# Patient Record
Sex: MALE
[De-identification: names, ages, dates, MRNs, and addresses within clinical notes are randomized per-mention and may not be internally consistent; named-entity substitution may affect disease eponyms.]

---

## 2021-02-01 ENCOUNTER — APPOINTMENT (OUTPATIENT)
Dept: INTERNAL MEDICINE | Facility: CLINIC | Age: 56
End: 2021-02-01
Payer: MEDICAID

## 2021-02-04 ENCOUNTER — APPOINTMENT (OUTPATIENT)
Dept: INTERNAL MEDICINE | Facility: CLINIC | Age: 56
End: 2021-02-04
Payer: MEDICAID

## 2021-02-04 VITALS
BODY MASS INDEX: 23.86 KG/M2 | SYSTOLIC BLOOD PRESSURE: 125 MMHG | DIASTOLIC BLOOD PRESSURE: 73 MMHG | WEIGHT: 152 LBS | HEIGHT: 67 IN | TEMPERATURE: 97.4 F

## 2021-02-04 DIAGNOSIS — F17.200 NICOTINE DEPENDENCE, UNSPECIFIED, UNCOMPLICATED: ICD-10-CM

## 2021-02-04 DIAGNOSIS — Z80.1 FAMILY HISTORY OF MALIGNANT NEOPLASM OF TRACHEA, BRONCHUS AND LUNG: ICD-10-CM

## 2021-02-04 DIAGNOSIS — J93.0 SPONTANEOUS TENSION PNEUMOTHORAX: ICD-10-CM

## 2021-02-04 PROCEDURE — 99205 OFFICE O/P NEW HI 60 MIN: CPT

## 2021-02-04 PROCEDURE — 99072 ADDL SUPL MATRL&STAF TM PHE: CPT

## 2021-02-04 NOTE — HISTORY OF PRESENT ILLNESS
[Sexually Active] : The patient is sexually active [Women] : with women [Female ___] : [unfilled] female [FreeTextEntry1] : Patient is a 55-year-old white male who is transitioning to a new infectious disease doctor. Patient was diagnosed with HIV in 2001. He states he was in snf at the time and had bilateral collapsed lungs requiring surgery. At that time HIV was diagnosed. He states his viral load was low but it is unaware of any infections at that time specifically pneumocystis.\par Patient was placed on Atripla and eventually transitioned to genvoya. He states he never had hepatitis B or C. or any STDs. 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

## 2021-02-04 NOTE — ASSESSMENT
[FreeTextEntry1] : Patient seen for  evaluation of HIV disease. Prior labs have been reviewed and discussed with the patient.  Compliant with all medications. Viral load remains undetectable per patient but\par Severe pain and T-cell count remains 700 per patient but no LAD severe pain The patient is up to date in vaccines in all issues surrounding their illness had been discussed and all questions answered.  Goals of therapy and issues of transmissibility as well as STD prevention were discussed in depth. Patient was conversant and all relevant questions were asked and answered .The patient has been instructed to followup here 4 weeks\par \par Patient comes here with no labs. We will attempt to get all labs and start of her hospital. Patient was sent for full laboratory data including HIV screening test to confirm these HIV positive in the absence of any labs. We will also check for hepatitis ABC as well as syphilis and tuberculosis\par \par At next visit we will discuss changing medication to BIKTARVY\par \par Patient states he is not currently in any long-term relationships and condom use was discussed with all sexual encounters\par \par All issues regarding patient's health and medical problems have been discussed. The patient understands and concurs with the treatment plan.\par \par This was an extended visit lasting 60minutes, including review of prior notes laboratory data and examination and discussing with patient including completion of current note.\par \par  [Treatment Education] : treatment education [Treatment Adherence] : treatment adherence [Rx Dose / Side Effects] : Rx dose/side effects [Risk Reduction] : risk reduction [Drug Interactions / Side Effects] : drug interactions/side effects [Disclosure of Diagnosis] : disclosure of diagnosis [HIV Education] : HIV Education [Sexuality / Safer Sex] : sexuality/safer sex

## 2021-03-08 ENCOUNTER — APPOINTMENT (OUTPATIENT)
Dept: INTERNAL MEDICINE | Facility: CLINIC | Age: 56
End: 2021-03-08
Payer: MEDICAID

## 2021-03-08 VITALS
TEMPERATURE: 96.8 F | SYSTOLIC BLOOD PRESSURE: 100 MMHG | DIASTOLIC BLOOD PRESSURE: 70 MMHG | BODY MASS INDEX: 24.33 KG/M2 | HEIGHT: 67 IN | WEIGHT: 155 LBS

## 2021-03-08 PROCEDURE — 99215 OFFICE O/P EST HI 40 MIN: CPT

## 2021-03-08 PROCEDURE — 99072 ADDL SUPL MATRL&STAF TM PHE: CPT

## 2021-03-08 RX ORDER — ELVITEGRAVIR, COBICISTAT, EMTRICITABINE, AND TENOFOVIR ALAFENAMIDE 150; 150; 200; 10 MG/1; MG/1; MG/1; MG/1
150-150-200-10 TABLET ORAL
Qty: 30 | Refills: 0 | Status: COMPLETED | COMMUNITY
Start: 2021-01-27 | End: 2021-03-08

## 2021-03-08 NOTE — DATA REVIEWED
[FreeTextEntry1] : labs reviewed\par BEP B SAB POS\par HEP A AB POS\par CD4 900\par VL<20\par HEP C NEH\par SYPH NEG

## 2021-03-08 NOTE — HISTORY OF PRESENT ILLNESS
[Sexually Active] : The patient is sexually active [FreeTextEntry1] : Patient is a 55-year-old white male who is transitioning to a new infectious disease doctor. Patient was diagnosed with HIV in 2001. He states he was in snf at the time and had bilateral collapsed lungs requiring surgery. At that time HIV was diagnosed. He states his viral load was low but it is unaware of any infections at that time specifically pneumocystis.\par Patient was placed on Atripla and eventually transitioned to genvoya. He states he never had hepatitis B or C. or any STDs. He states his T cells are approximately 700 and viral load is undetectable since initial labs after diagnosis.\par He is asymptomatic at the current time.\par Patient identifies as CIS gender heterosexual and is currently not in any long-term relationship.\par \par 03/08/91\par Patient presents for routine evaluation of HIV disease. Has been compliant with  medication and has recently had blood. Denies any OI symptoms and is otherwise feeling well. There are no new complaints.\par recent labs

## 2021-03-08 NOTE — HISTORY OF PRESENT ILLNESS
[Sexually Active] : The patient is sexually active [FreeTextEntry1] : Patient is a 55-year-old white male who is transitioning to a new infectious disease doctor. Patient was diagnosed with HIV in 2001. He states he [facility] the time and had bilateral collapsed lungs requiring surgery. At that time HIV was diagnosed. He states his viral load was low but it is unaware of any infections at that time specifically pneumocystis.\par Patient was placed on Atripla and eventually transitioned to genvoya. He states he never had hepatitis B or C. or any STDs. He states his T cells are approximately 700 and viral load is undetectable since initial labs after diagnosis.\par He is asymptomatic at the current time.\par Patient identifies as CIS gender heterosexual and is currently not in any long-term relationship.\par \par 03/08/91\par Patient presents for routine evaluation of HIV disease. Has been compliant with  medication and has recently had blood. Denies any OI symptoms and is otherwise feeling well. There are no new complaints.\par recent labs

## 2021-03-08 NOTE — ASSESSMENT
[FreeTextEntry1] : Patient seen for  evaluation of HIV disease. Prior labs have been reviewed and discussed with the patient.  Compliant with all medications. Viral load remains undetectable and T-cell count remains over 900] The patient is up to date in vaccines in all issues surrounding their illness had been discussed and all questions answered.  Goals of therapy and issues of transmissibility as well as STD prevention were discussed in depth. Patient was conversant and all relevant questions were asked and answered .The patient has been instructed to followup here 4 months\par Patient had full laboratory data and is immunized against hepatitis AB and hep C negative as mentioned.  We had shared decision-making regarding choice of medication.  There is my recommendation that he start on Biktarvy which was sent to his drugstore and stop Genvoya.  Patient states he will make his decision based on the cost of the drug.  We did discuss drug coupons and he will decide and let the office know.  Patient will follow up in 4 months with new blood work\par All issues regarding patient's health and medical problems have been discussed. The patient understands and concurs with the treatment plan.\par This was an extended visit lasting 45 minutes, including review of prior notes laboratory data and examination and discussing with patient including completion of current note.\par \par

## 2021-11-16 ENCOUNTER — RX RENEWAL (OUTPATIENT)
Age: 56
End: 2021-11-16

## 2022-05-04 ENCOUNTER — NON-APPOINTMENT (OUTPATIENT)
Age: 57
End: 2022-05-04

## 2022-05-09 NOTE — HISTORY OF PRESENT ILLNESS
[FreeTextEntry1] : Patient is a 55-year-old white male who is transitioning to a new infectious disease doctor. Patient was diagnosed with HIV in 2001. 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 At that time HIV was diagnosed. He states his viral load was low but it is unaware of any infections at that time specifically pneumocystis.\par Patient was placed on Atripla and eventually transitioned to genvoya. He states he never had hepatitis B or C. or any STDs. He states his T cells are approximately 700 and viral load is undetectable since initial labs after diagnosis.\par He is asymptomatic at the current time.\par Patient identifies as CIS gender heterosexual and is currently not in any long-term relationship.\par \par 03/08/91\par Patient presents for routine evaluation of HIV disease. Has been compliant with  medication and has recently had blood. Denies any OI symptoms and is otherwise feeling well. There are no new complaints.\par recent labs\par \par [id number]

## 2022-05-10 ENCOUNTER — APPOINTMENT (OUTPATIENT)
Dept: INTERNAL MEDICINE | Facility: CLINIC | Age: 57
End: 2022-05-10

## 2022-05-10 ENCOUNTER — NON-APPOINTMENT (OUTPATIENT)
Age: 57
End: 2022-05-10

## 2022-11-14 ENCOUNTER — APPOINTMENT (OUTPATIENT)
Dept: INTERNAL MEDICINE | Facility: CLINIC | Age: 57
End: 2022-11-14

## 2022-11-14 VITALS
DIASTOLIC BLOOD PRESSURE: 70 MMHG | WEIGHT: 155 LBS | HEIGHT: 67 IN | BODY MASS INDEX: 24.33 KG/M2 | SYSTOLIC BLOOD PRESSURE: 100 MMHG

## 2022-11-14 PROCEDURE — 99214 OFFICE O/P EST MOD 30 MIN: CPT

## 2022-11-14 NOTE — PHYSICAL EXAM
[General Appearance - Alert] : alert [General Appearance - In No Acute Distress] : in no acute distress [Sclera] : the sclera and conjunctiva were normal [PERRL With Normal Accommodation] : pupils were equal in size, round, reactive to light [Extraocular Movements] : extraocular movements were intact [Outer Ear] : the ears and nose were normal in appearance [Oropharynx] : the oropharynx was normal with no thrush [Neck Appearance] : the appearance of the neck was normal [Neck Cervical Mass (___cm)] : no neck mass was observed [Jugular Venous Distention Increased] : there was no jugular-venous distention [Thyroid Diffuse Enlargement] : the thyroid was not enlarged [Auscultation Breath Sounds / Voice Sounds] : lungs were clear to auscultation bilaterally [Heart Rate And Rhythm] : heart rate was normal and rhythm regular [Heart Sounds] : normal S1 and S2 [Heart Sounds Gallop] : no gallops [Murmurs] : no murmurs [Heart Sounds Pericardial Friction Rub] : no pericardial rub [Full Pulse] : the pedal pulses are present [Edema] : there was no peripheral edema [Bowel Sounds] : normal bowel sounds [Abdomen Soft] : soft [Abdomen Tenderness] : non-tender [Costovertebral Angle Tenderness] : no CVA tenderness [Abdomen Mass (___ Cm)] : no abdominal mass palpated [Penis Abnormality] : the penis was normal [Scrotum] : the scrotum was normal [Testes Swelling] : the testicles were not swollen [Testes Mass (___cm)] : there were no testicular masses [No Palpable Adenopathy] : no palpable adenopathy [Musculoskeletal - Swelling] : no joint swelling [Nail Clubbing] : no clubbing  or cyanosis of the fingernails [Motor Tone] : muscle strength and tone were normal [Skin Color & Pigmentation] : normal skin color and pigmentation [] : no rash [Deep Tendon Reflexes (DTR)] : deep tendon reflexes were 2+ and symmetric [Sensation] : the sensory exam was normal to light touch and pinprick [No Focal Deficits] : no focal deficits [Oriented To Time, Place, And Person] : oriented to person, place, and time [Affect] : the affect was normal

## 2022-11-14 NOTE — ASSESSMENT
[FreeTextEntry1] : Patient seen for  evaluation of HIV disease. Prior labs have been reviewed and discussed with the patient.  Compliant with all medications. Viral load remains undetectable as of March and T-cell count remains over 900 as of March the patient is up to date in vaccines in all issues surrounding their illness had been discussed and all questions answered.  Goals of therapy and issues of transmissibility as well as STD prevention were discussed in depth. Patient was conversant and all relevant questions were asked and answered .The patient has been instructed to followup here 6 months\par \par We discussed vaccinations with patient is adamant that he does not take flu COVID or pneumococcal vaccine.  Lab slips given medications renewed.  Insert care summary

## 2022-11-14 NOTE — HISTORY OF PRESENT ILLNESS
[Sexually Active] : The patient is sexually active [FreeTextEntry1] : Patient is a 55-year-old white male who is transitioning to a new infectious disease doctor. Patient was diagnosed with HIV in 2001. He states he was in [facility] at the time and had bilateral collapsed lungs requiring surgery. At that time HIV was diagnosed. He states his viral load was low but it is unaware of any infections at that time specifically pneumocystis.\par Patient was placed on Atripla and eventually transitioned to genvoya. He states he never had hepatitis B or C. or any STDs. He states his T cells are approximately 700 and viral load is undetectable since initial labs after diagnosis.\par He is asymptomatic at the current time.\par Patient identifies as CIS gender heterosexual and is currently not in any long-term relationship.\par \par 03/08/91\par Patient presents for routine evaluation of HIV disease. Has been compliant with  medication and has recently had blood. Denies any OI symptoms and is otherwise feeling well. There are no new complaints.\par recent labs\par 11/14/22\par Patient left message last appointment due to being in [place] nonviolent crime.  He was released 3 days ago.  He did not engage in any sexual activity since last visit.  He is asymptomatic compliant with medications and offers no problems.  He refuses all vaccination

## 2023-05-14 PROBLEM — Z78.9 HEPATITIS B IMMUNE: Status: ACTIVE | Noted: 2021-03-08

## 2023-05-14 PROBLEM — Z78.9 HEPATITIS A IMMUNE: Status: ACTIVE | Noted: 2021-03-08

## 2023-05-14 PROBLEM — B20 HIV DISEASE: Status: ACTIVE | Noted: 2021-02-04

## 2023-05-14 NOTE — HISTORY OF PRESENT ILLNESS
[Sexually Active] : The patient is sexually active [FreeTextEntry1] : Patient is a 55-year-old white male who is transitioning to a new infectious disease doctor. Patient was diagnosed with HIV in 2001. 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 At that time HIV was diagnosed. He states his viral load was low but it is unaware of any infections at that time specifically pneumocystis.\par Patient was placed on Atripla and eventually transitioned to genvoya. He states he never had hepatitis B or C. or any STDs. He states his T cells are approximately 700 and viral load is undetectable since initial labs after diagnosis.\par He is asymptomatic at the current time.\par Patient identifies as CIS gender heterosexual and is currently not in any long-term relationship.\par \par 03/08/19\par Patient presents for routine evaluation of HIV disease. Has been compliant with  medication and has recently had blood. Denies any OI symptoms and is otherwise feeling well. There are no new complaints.\par recent labs\par 11/14/22\par Patient left message last appointment due to being in skilled nursing for nonviolent crime.  He was released 3 days ago.  He did not engage in any sexual activity since last visit.  He is asymptomatic compliant with medications and offers no problems.  He refuses all vaccination\par \par [id number]

## 2023-05-14 NOTE — HISTORY OF PRESENT ILLNESS
[FreeTextEntry1] : Patient is a 55-year-old white male who is transitioning to a new infectious disease doctor. Patient was diagnosed with HIV in 2001. He states he was in jail at the time and had bilateral collapsed lungs requiring surgery. At that time HIV was diagnosed. He states his viral load was low but it is unaware of any infections at that time specifically pneumocystis.\par Patient was placed on Atripla and eventually transitioned to genvoya. He states he never had hepatitis B or C. or any STDs. He states his T cells are approximately 700 and viral load is undetectable since initial labs after diagnosis.\par He is asymptomatic at the current time.\par Patient identifies as CIS gender heterosexual and is currently not in any long-term relationship.\par \par 03/08/19\par Patient presents for routine evaluation of HIV disease. Has been compliant with  medication and has recently had blood. Denies any OI symptoms and is otherwise feeling well. There are no new complaints.\par recent labs\par 11/14/22\par Patient left message last appointment due to being in [facility] for [facility].  He was released 3 days ago.  He did not engage in any sexual activity since last visit.  He is asymptomatic compliant with medications and offers no problems.  He refuses all vaccination\par \par [id number] [Sexually Active] : The patient is sexually active

## 2023-05-15 ENCOUNTER — APPOINTMENT (OUTPATIENT)
Dept: INTERNAL MEDICINE | Facility: CLINIC | Age: 58
End: 2023-05-15

## 2023-05-15 DIAGNOSIS — B20 HUMAN IMMUNODEFICIENCY VIRUS [HIV] DISEASE: ICD-10-CM

## 2023-05-15 DIAGNOSIS — Z78.9 OTHER SPECIFIED HEALTH STATUS: ICD-10-CM

## 2023-05-23 ENCOUNTER — RX RENEWAL (OUTPATIENT)
Age: 58
End: 2023-05-23

## 2023-07-30 ENCOUNTER — RX RENEWAL (OUTPATIENT)
Age: 58
End: 2023-07-30

## 2023-09-10 ENCOUNTER — RX RENEWAL (OUTPATIENT)
Age: 58
End: 2023-09-10

## 2023-12-04 ENCOUNTER — RX RENEWAL (OUTPATIENT)
Age: 58
End: 2023-12-04

## 2023-12-04 RX ORDER — BICTEGRAVIR SODIUM, EMTRICITABINE, AND TENOFOVIR ALAFENAMIDE FUMARATE 50; 200; 25 MG/1; MG/1; MG/1
50-200-25 TABLET ORAL
Qty: 30 | Refills: 2 | Status: ACTIVE | COMMUNITY
Start: 2021-03-08 | End: 1900-01-01